# Patient Record
Sex: MALE | NOT HISPANIC OR LATINO | Employment: UNEMPLOYED | URBAN - METROPOLITAN AREA
[De-identification: names, ages, dates, MRNs, and addresses within clinical notes are randomized per-mention and may not be internally consistent; named-entity substitution may affect disease eponyms.]

---

## 2023-10-20 PROBLEM — D22.72 MELANOCYTIC NEVI OF LEFT LOWER LIMB, INCLUDING HIP: Status: ACTIVE | Noted: 2023-07-06

## 2023-10-20 PROBLEM — D22.39 MELANOCYTIC NEVI OF OTHER PARTS OF FACE: Status: ACTIVE | Noted: 2023-07-06

## 2023-10-20 PROBLEM — L40.0 PSORIASIS VULGARIS: Status: ACTIVE | Noted: 2023-07-06

## 2023-10-20 PROBLEM — L73.9 FOLLICULAR DISORDER, UNSPECIFIED: Status: ACTIVE | Noted: 2023-07-06

## 2023-10-20 PROBLEM — B07.8 OTHER VIRAL WARTS: Status: ACTIVE | Noted: 2023-07-06

## 2023-10-20 PROBLEM — S60.012A CONTUSION OF LEFT THUMB WITHOUT DAMAGE TO NAIL: Status: ACTIVE | Noted: 2023-07-06

## 2023-10-20 PROBLEM — D22.62 MELANOCYTIC NEVI OF LEFT UPPER LIMB, INCLUDING SHOULDER: Status: ACTIVE | Noted: 2023-07-06

## 2023-10-20 PROBLEM — D22.5 MELANOCYTIC NEVI OF TRUNK: Status: ACTIVE | Noted: 2023-07-06

## 2023-10-20 PROBLEM — L82.1 OTHER SEBORRHEIC KERATOSIS: Status: ACTIVE | Noted: 2023-07-06

## 2023-10-20 PROBLEM — D22.61 MELANOCYTIC NEVI OF RIGHT UPPER LIMB, INCLUDING SHOULDER: Status: ACTIVE | Noted: 2023-07-06

## 2023-10-20 PROBLEM — D18.01 HEMANGIOMA OF SKIN AND SUBCUTANEOUS TISSUE: Status: ACTIVE | Noted: 2023-07-06

## 2023-10-20 PROBLEM — D48.5 NEOPLASM OF UNCERTAIN BEHAVIOR OF SKIN: Status: ACTIVE | Noted: 2023-07-06

## 2023-10-20 PROBLEM — L81.4 OTHER MELANIN HYPERPIGMENTATION: Status: ACTIVE | Noted: 2023-07-06

## 2023-10-20 PROBLEM — D22.71 MELANOCYTIC NEVI OF RIGHT LOWER LIMB, INCLUDING HIP: Status: ACTIVE | Noted: 2023-07-06

## 2023-10-20 RX ORDER — PIMECROLIMUS 10 MG/G
CREAM TOPICAL
COMMUNITY
Start: 2021-12-01

## 2023-10-20 RX ORDER — CLINDAMYCIN PHOSPHATE 10 MG/G
GEL TOPICAL
COMMUNITY
Start: 2021-11-03 | End: 2023-10-24 | Stop reason: ALTCHOICE

## 2023-10-20 RX ORDER — CLINDAMYCIN AND BENZOYL PEROXIDE 1 %-5 %
KIT TOPICAL
COMMUNITY
Start: 2021-11-03 | End: 2023-10-24 | Stop reason: ALTCHOICE

## 2023-10-20 RX ORDER — HYDROCORTISONE 25 MG/G
CREAM TOPICAL
COMMUNITY
Start: 2021-11-03

## 2023-10-20 RX ORDER — MUPIROCIN 20 MG/G
OINTMENT TOPICAL
COMMUNITY
Start: 2022-07-20 | End: 2023-10-24 | Stop reason: ALTCHOICE

## 2023-10-24 ENCOUNTER — OFFICE VISIT (OUTPATIENT)
Dept: DERMATOLOGY | Facility: CLINIC | Age: 48
End: 2023-10-24
Payer: COMMERCIAL

## 2023-10-24 DIAGNOSIS — K50.918: ICD-10-CM

## 2023-10-24 DIAGNOSIS — L92.8: ICD-10-CM

## 2023-10-24 DIAGNOSIS — B07.8 OTHER VIRAL WARTS: Primary | ICD-10-CM

## 2023-10-24 PROCEDURE — 99214 OFFICE O/P EST MOD 30 MIN: CPT | Performed by: DERMATOLOGY

## 2023-10-24 PROCEDURE — 17110 DESTRUCTION B9 LES UP TO 14: CPT | Performed by: DERMATOLOGY

## 2023-10-24 RX ORDER — BUSPIRONE HYDROCHLORIDE 5 MG/1
TABLET ORAL
COMMUNITY
Start: 2022-06-20 | End: 2023-12-04 | Stop reason: ALTCHOICE

## 2023-10-24 RX ORDER — TADALAFIL 5 MG/1
5 TABLET ORAL
COMMUNITY
Start: 2022-12-15

## 2023-10-24 RX ORDER — FOLIC ACID 0.4 MG
TABLET ORAL
COMMUNITY

## 2023-10-24 RX ORDER — HYDROCORTISONE 25 MG/G
OINTMENT TOPICAL
Qty: 28 G | Refills: 0 | Status: SHIPPED | OUTPATIENT
Start: 2023-10-24

## 2023-10-24 RX ORDER — NEOMYCIN SULFATE, POLYMYXIN B SULFATE AND DEXAMETHASONE 3.5; 10000; 1 MG/ML; [USP'U]/ML; MG/ML
SUSPENSION/ DROPS OPHTHALMIC
COMMUNITY
Start: 2020-04-27 | End: 2023-12-04 | Stop reason: ALTCHOICE

## 2023-10-24 RX ORDER — ACETAMINOPHEN 500 MG
1 TABLET ORAL
COMMUNITY

## 2023-10-24 RX ORDER — BUDESONIDE 3 MG/1
CAPSULE, COATED PELLETS ORAL
COMMUNITY
Start: 2023-03-15

## 2023-10-24 RX ORDER — BACLOFEN 10 MG/1
10 TABLET ORAL 3 TIMES DAILY PRN
COMMUNITY
Start: 2023-06-22 | End: 2023-12-27 | Stop reason: ALTCHOICE

## 2023-10-24 NOTE — PROGRESS NOTES
Subjective     Romaine Crews is a 48 y.o. male who presents for the following: Wart (Right index finger- hx ln2, pt states has been doing otc salicylic acid - pt states was gone but now is back. ).     Review of Systems:  No other skin or systemic complaints other than what is documented elsewhere in the note.    The following portions of the chart were reviewed this encounter and updated as appropriate:   Allergies  Meds  Problems  Med Hx  Surg Hx  Fam Hx         Skin Cancer History  No skin cancer on file.      Specialty Problems          Dermatology Problems    Contusion of left thumb without damage to nail    Follicular disorder, unspecified    Hemangioma of skin and subcutaneous tissue    Melanocytic nevi of left lower limb, including hip    Melanocytic nevi of left upper limb, including shoulder    Melanocytic nevi of other parts of face    Melanocytic nevi of right lower limb, including hip    Melanocytic nevi of right upper limb, including shoulder    Melanocytic nevi of trunk    Neoplasm of uncertain behavior of skin    Other melanin hyperpigmentation    Other seborrheic keratosis    Other viral warts    Psoriasis vulgaris        Objective   Well appearing patient in no apparent distress; mood and affect are within normal limits.    A focused skin examination was performed. All findings within normal limits unless otherwise noted below.    Assessment/Plan   1. Other viral warts  Right Proximal 2nd Finger  Verrucous papule(s)/plaque(s)    -Discussed nature of condition  -Multiple treatments in office are often needed  -Diligent at home therapy is often needed, continue salicylic acid 40% nightly under occlusion  -Cryotherapy today  -Possible side effects of liquid nitrogen treatment reviewed including formation of blisters, crusting, tenderness, scar, and discoloration which may be permanent.  -Patient advised to return the office for re-evaluation if the treated lesion(s) do not resolve within 4-6  weeks. Patient verbalizes understanding.    Destr of lesion - Right Proximal 2nd Finger  Complexity: simple    Destruction method: cryotherapy    Informed consent: discussed and consent obtained    Debridement: hyperkeratotic portion removed with sharp debridement    Lesion destroyed using liquid nitrogen: Yes    Cryotherapy cycles:  3  Outcome: patient tolerated procedure well with no complications    Post-procedure details: wound care instructions given      2. Metastatic Crohn's disease of skin (CMS/HCC)  Anus    Patient with cutaneous inflammation of the perianal skin  -Flaring presently per patient due to changing of Crohn's disease medications (patient potentially to start Entyvio)  -Pt reports that HC cream burns  -Start HC ointment 2.5% BID for 2 weeks, then discontinue and resume pimecrolimus BID for maintenance    Related Medications  hydrocortisone 2.5 % ointment  Apply to affected areas twice daily when active as needed. Use less than 14 days per month.        Follow up in 4 weeks for wart  Discussed if there are any changes or development of concerning symptoms (lesion/skin condition is changing, bleeding, enlarging, or worsening) the patient is to contact my office. The patient verbalizes understanding.    Le Lambert MD  10/24/2023

## 2023-11-28 ENCOUNTER — OFFICE VISIT (OUTPATIENT)
Dept: GASTROENTEROLOGY | Facility: CLINIC | Age: 48
End: 2023-11-28
Payer: COMMERCIAL

## 2023-11-28 VITALS
HEART RATE: 61 BPM | DIASTOLIC BLOOD PRESSURE: 70 MMHG | WEIGHT: 185 LBS | BODY MASS INDEX: 26.48 KG/M2 | SYSTOLIC BLOOD PRESSURE: 110 MMHG | HEIGHT: 70 IN | OXYGEN SATURATION: 98 %

## 2023-11-28 DIAGNOSIS — K51.919 ULCERATIVE COLITIS WITH COMPLICATION, UNSPECIFIED LOCATION (MULTI): Primary | ICD-10-CM

## 2023-11-28 PROCEDURE — 1036F TOBACCO NON-USER: CPT | Performed by: INTERNAL MEDICINE

## 2023-11-28 PROCEDURE — 99205 OFFICE O/P NEW HI 60 MIN: CPT | Performed by: INTERNAL MEDICINE

## 2023-11-28 RX ORDER — SULFASALAZINE 500 MG/1
500 TABLET ORAL 4 TIMES DAILY
COMMUNITY

## 2023-11-28 NOTE — PATIENT INSTRUCTIONS
I will request records from your rheumatologist as well as your gastroenterologist.    I will also reach out to your rheumatologist to discuss potential treatments for UC.      Follow up after review of records.

## 2023-11-28 NOTE — PROGRESS NOTES
[facility]    ASSESSMENT and PLAN:       Romaine Crews is a 48 y.o. male with a significant past medical history of depression/anxiety, melanocytic nevi and psoriasis who presents for consultation requested by his primary care provider (Justina Renee MD) for a second opinion.       UC  History of UC previously followed at [facility] and now followed at [facility]. He has been referred to  for a second opinion and it appears that he was intended to see Dr. Shaw. He has been on biologics (Remicade and now Stelara) with incomplete response. 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 His GI team was considering a change in treatment and possibly Entyvio, but they had recommended evaluation at a tertiary care center for consideration of combination therapy with Entyvio and Stelara. Appears that Stelara is being managed by his rheumatologist. Entyvio would certainly be an option for UC, but given the gut specificity it seems less likely to control any coexisting rheumatologic disease. Could also consider small molecules such as Tofacitinib, upadacitinib, Ozanimod, or Etrasimod. Unclear if those would be acceptable monotherapy for his rheumatologic disease. Will request complete records from his current GI providers as well as his rheumatologist. I did also reach out to his rheumatologist via telephone on 11/29 at [phone], but unfortunately there was no answer at the number listed. Will attempt to discuss case further with rheumatology again and then may need to discuss case at  multidisciplinary IBD conference.        Hamlet Winters MD        Gastroenterology    [facility]    Clinical   [facility]        Subjective   HISTORY OF PRESENT ILLNESS:     Chief Complaint  Referral for second opinion (Referred by [facility] GI - colitis /Last stelara is due  "12/18/23)    History Of Present Illness:    Romaine Crews is a 48 y.o. male with a significant past medical history of depression/anxiety, melanocytic nevi and psoriasis who presents for consultation requested by his primary care provider (Justina Renee MD) for a second opinion.       Medication list includes Ustekinumab and Budesonide.    He was seen by dermatology in October and they reported a history of Crohn's disease with perianal involvement and a possible plan to change Crohn's treatment to Entyvio. Another note from his PCP in June states that he has a history of UC (pancolitis).    There are no endoscopy reports available in the EMR.    Most recent CRP was normal in January 2023.      Mr. Crews reports that he has a long history of \"colitis\" that has been followed by multiple GI providers in the past. He says that he followed with Jeannine Stallings MD at [facility] up until 3-4 years ago and since then has followed with GI at [facility]. He says that he has been seeing ISIDRA Nassar at [facility]. He says that he was last seen by her in October and at that time he was told to find somebody to follow with that would prescribe Entyvio. He thinks that Bria may have talked to the physicians at [facility] and that evaluation by Dr. Shaw (IBD specialist) at  was recommended.    He says that he also follows with Marian Sibley MD at [facility] for rheumatology. He says that he has multiple rheumatologic issues including SAPHO (synovitis, acne, pustulosis palmaris, hyperostosis, osteomyelitis) syndrome and Dr. Sibley was the one that was prescribing his Stelara because it was thought that it would treat his IBD as well as his rheumatologic disease.    Full records from Dr. Sibley and Bria Carter are not available. A telephone note from Bria Carter on 10/12 indicates that she had discussed his case with Dr. Uribe who had recommended referral to  (Dr. Shaw) due to complexity of " "case. An office visit from Bria Carter dated 10/10/2023 stated that he was previously followed by Dr. Hendrix and Dr. Lugo and Dr. Arthur for UC and that \"Remicade discontinued; started Stelara per --Rheumatology (for osteomeylitis)\". A summary of previous endoscopies included in that note is copied below.    \"Colon 2009 - severe pancolitis with pseudopolyps  Colon 2013 - severe distal colitis up to 30cm from anal verge. Pseudopolyps. ---> added rowasa  Colon 3/2016 - pancolitis  Colon 3/2017 - pancolitis  Flex sig 12/2017- Dr. Stinson  Colonoscopy 11/2018 - pancolitis  Colonoscopy 2019 Dr. Elena- proctitis  Last colonoscopy 3/8/2023 Dr. Nam  Last EGD 2/28/2022 Dr. Nam  MRE- ? Ileitis- suspect backwash ileitis\"    He says that he was diagnosed with UC in 2006 and initially was treated with Asacol, but this was stopped because of headaches. He says that he was then started on Azulfidine. He says that he has then been on Azulfidine since that time and currently he is taking 4 pills daily.    He says that he was also treated with Remicade for 6-8 years starting in 2012. He says that this was stopped due to a lack of response and he says that there was some lab that was checked which showed that Remicade wouldn't work. He isn't sure what that was, but thinks that it might have been antibodies to Remicade.    He has now been on Stelara for the last 3 years which has been prescribed by his rheumatologist.    He has also used steroids. He is currently on Budesonide and has been on 9 mg per day for the last 2 months. He was told to start weaning that medication and he says that he is about to decrease it to 6 mg per day. He has used prednisone in the past and he says that at one time he was on prednisone for a very long time. Ultimately he says that prednisone was stopped in 2011 because he developed some abnormal bone/bone marrow growth that required surgery. At that time he says that he was told never to " use prednisone again.    His last colonoscopy was in March 2023. He brought a procedure report from that time which showed a normal TI, erythema at the IC valve, and diffuse erythematous/scarred mucosa with pseudopolyps through the entire colon. Biopsies from the TI showed normal mucosa. IC valve biopsies showed lymphoid aggregates with minimal acute inflammation. Remainder of the pathology showed active inflammatory changes in the cecum/ascending colon as well as chronic colitis changes in the remainder of the colon. The colonoscopy report does not mention if findings are improved or worsened from previous. The patient says that he thinks it might have been worse.        Currently he says that he has been having 5-10 BMs per day. He has not had any blood in his stools for the last month. He says that he frequently has tenesmus and he was told in the past that it was partially due to pelvic floor dysfunction and physical therapy was recommended. He denies any abdominal pain and says that his weight has been stable.      Patient denies any heartburn/GERD, N/V, dysphagia, odynophagia, abdominal pain, constipation, hematemesis, hematochezia, melena, or weight loss.      Review of systems:   Review of Systems   Constitutional:  Positive for fatigue. Negative for chills, fever and unexpected weight change.   HENT:  Negative for congestion, sneezing, sore throat, trouble swallowing and voice change.    Respiratory:  Negative for cough, shortness of breath and wheezing.    Cardiovascular:  Negative for chest pain, palpitations and leg swelling.   Gastrointestinal:         As detailed above.   Genitourinary:  Negative for dysuria and hematuria.   Musculoskeletal:  Positive for arthralgias. Negative for myalgias.   Skin:  Negative for pallor and rash.   Neurological:  Positive for weakness. Negative for dizziness, seizures, syncope, numbness and headaches.   Hematological:  Negative for adenopathy. Does not bruise/bleed  "easily.   Psychiatric/Behavioral:  Negative for confusion. The patient is nervous/anxious.    All other systems reviewed and are negative.      I performed a complete 10 point review of systems and it is negative except as noted in HPI or above.      PAST HISTORIES:       Past Medical History:  He has no past medical history on file.    Past Surgical History:  He has no past surgical history on file.      Social History:  He reports that he has never smoked. He has never used smokeless tobacco. He reports that he does not drink alcohol and does not use drugs.    Family History:      Family History   Problem Relation Name Age of Onset    Ulcerative colitis Brother      Pancreatic cancer Mother's Brother      Liver cancer Mother's Brother      Colon cancer Mother's Brother      Allergies Other      Asthma Other      Eczema Other          Allergies:  Ciprofloxacin, Doxycycline, and Ketamine      Objective   OBJECTIVE:       Last Recorded Vitals:  Vitals:    11/28/23 1046   BP: 110/70   Pulse: 61   SpO2: 98%   Weight: 83.9 kg (185 lb)   Height: 1.778 m (5' 10\")     /70   Pulse 61   Ht 1.778 m (5' 10\")   Wt 83.9 kg (185 lb)   SpO2 98%   BMI 26.54 kg/m²      Physical Exam:    Physical Exam  Vitals reviewed.   Constitutional:       General: He is not in acute distress.     Appearance: He is not ill-appearing.   HENT:      Head: Normocephalic and atraumatic.   Eyes:      General: No scleral icterus.  Cardiovascular:      Rate and Rhythm: Normal rate and regular rhythm.      Pulses: Normal pulses.      Heart sounds: Normal heart sounds. No murmur heard.  Pulmonary:      Effort: Pulmonary effort is normal. No respiratory distress.      Breath sounds: Normal breath sounds. No wheezing.   Abdominal:      General: Bowel sounds are normal.      Palpations: Abdomen is soft.      Tenderness: There is no abdominal tenderness. There is no rebound.   Musculoskeletal:         General: No swelling or deformity.   Skin:     " "General: Skin is warm and dry.      Coloration: Skin is not jaundiced.      Findings: No rash.   Neurological:      General: No focal deficit present.      Mental Status: He is alert and oriented to person, place, and time.   Psychiatric:         Mood and Affect: Mood normal.         Behavior: Behavior normal.         Thought Content: Thought content normal.         Judgment: Judgment normal.         Home Medications:  Prior to Admission medications    Medication Sig Start Date End Date Taking? Authorizing Provider   baclofen (Lioresal) 10 mg tablet Take 1 tablet (10 mg) by mouth 3 times a day as needed. 6/22/23 12/11/23  Historical Provider, MD   budesonide EC (Entocort EC) 3 mg 24 hr capsule Take by mouth. 3/15/23   Historical Provider, MD   busPIRone (Buspar) 5 mg tablet  6/20/22   Historical Provider, MD   cholecalciferol (Vitamin D-3) 50 mcg (2,000 unit) capsule Take 1 capsule (2,000 Units) by mouth once daily.    Historical Provider, MD   folic acid (Folvite) 400 mcg tablet once daily.    Historical Provider, MD   hydrocortisone 2.5 % cream 1 Application 11/3/21   Historical Provider, MD   hydrocortisone 2.5 % ointment Apply to affected areas twice daily when active as needed. Use less than 14 days per month. 10/24/23   Le Lambert MD   neomycin-polymyxin-dexAMETHasone (Maxitrol) 3.5mg/mL-10,000 unit/mL-0.1 % ophthalmic suspension SHAKE LQ AND INT 1 GTT IN OU QID FOR 14 DAYS 4/27/20   Historical Provider, MD   pimecrolimus (Elidel) 1 % cream 1 Application 12/1/21   Historical Provider, MD   tadalafil (Cialis) 5 mg tablet Take 1 tablet (5 mg) by mouth once daily. 12/15/22   Historical Provider, MD   ustekinumab (Stelara) solution 520 mg 8/3/20   Historical Provider, MD         Relevant Results Recent labs reviewed in the EMR.  No results found for: \"HGB\", \"MCV\", \"PLT\"    No results found for: \"FERRITIN\", \"IRON\"    No results found for: \"NA\", \"K\", \"CL\", \"BUN\", \"CREATININE\"    No results found for: " "\"BILITOT\"  No results found for: \"ALT\", \"AST\", \"ALKPHOS\"    No results found for: \"CRP\"    No results found for: \"CALPS\"    Radiology: Imaging reviewed in the EMR.  No results found.      "

## 2023-11-29 ASSESSMENT — ENCOUNTER SYMPTOMS
MYALGIAS: 0
SEIZURES: 0
SHORTNESS OF BREATH: 0
HEADACHES: 0
TROUBLE SWALLOWING: 0
PALPITATIONS: 0
ARTHRALGIAS: 1
CHILLS: 0
UNEXPECTED WEIGHT CHANGE: 0
WEAKNESS: 1
NERVOUS/ANXIOUS: 1
FEVER: 0
DIZZINESS: 0
ROS GI COMMENTS: AS DETAILED ABOVE.
CONFUSION: 0
BRUISES/BLEEDS EASILY: 0
HEMATURIA: 0
DYSURIA: 0
VOICE CHANGE: 0
ADENOPATHY: 0
SORE THROAT: 0
NUMBNESS: 0
WHEEZING: 0
COUGH: 0
FATIGUE: 1

## 2023-12-01 ENCOUNTER — TELEPHONE (OUTPATIENT)
Dept: GASTROENTEROLOGY | Facility: CLINIC | Age: 48
End: 2023-12-01
Payer: COMMERCIAL

## 2023-12-01 ASSESSMENT — DERMATOLOGY QUALITY OF LIFE (QOL) ASSESSMENT
RATE HOW BOTHERED YOU ARE BY SYMPTOMS OF YOUR SKIN PROBLEM (EG, ITCHING, STINGING BURNING, HURTING OR SKIN IRRITATION): 6 - ALWAYS BOTHERED
RATE HOW BOTHERED YOU ARE BY EFFECTS OF YOUR SKIN PROBLEMS ON YOUR ACTIVITIES (EG, GOING OUT, ACCOMPLISHING WHAT YOU WANT, WORK ACTIVITIES OR YOUR RELATIONSHIPS WITH OTHERS): 6 - ALWAYS BOTHERED
WHAT SINGLE SKIN CONDITION LISTED BELOW IS THE PATIENT ANSWERING THE QUALITY-OF-LIFE ASSESSMENT QUESTIONS ABOUT: DERMATITIS
WHAT SINGLE SKIN CONDITION LISTED BELOW IS THE PATIENT ANSWERING THE QUALITY-OF-LIFE ASSESSMENT QUESTIONS ABOUT: DERMATITIS
RATE HOW BOTHERED YOU ARE BY EFFECTS OF YOUR SKIN PROBLEMS ON YOUR ACTIVITIES (EG, GOING OUT, ACCOMPLISHING WHAT YOU WANT, WORK ACTIVITIES OR YOUR RELATIONSHIPS WITH OTHERS): 6 - ALWAYS BOTHERED
RATE HOW EMOTIONALLY BOTHERED YOU ARE BY YOUR SKIN PROBLEM (FOR EXAMPLE, WORRY, EMBARRASSMENT, FRUSTRATION): 6 - ALWAYS BOTHERED
DATE THE QUALITY-OF-LIFE ASSESSMENT WAS COMPLETED: 60649
RATE HOW EMOTIONALLY BOTHERED YOU ARE BY YOUR SKIN PROBLEM (FOR EXAMPLE, WORRY, EMBARRASSMENT, FRUSTRATION): 6 - ALWAYS BOTHERED
RATE HOW BOTHERED YOU ARE BY SYMPTOMS OF YOUR SKIN PROBLEM (EG, ITCHING, STINGING BURNING, HURTING OR SKIN IRRITATION): 6 - ALWAYS BOTHERED

## 2023-12-01 ASSESSMENT — PATIENT GLOBAL ASSESSMENT (PGA): WHAT IS THE PGA: PATIENT GLOBAL ASSESSMENT:  3 - MODERATE

## 2023-12-01 NOTE — TELEPHONE ENCOUNTER
----- Message from Heath Aguillon MA sent at 2023  3:08 PM EST -----  Regarding: mail from Dr. Winters  Per email:     Please schedule a time for me to speak to Marian Sibley regarding Romaine Crews (MRN [id number],  1975). She is his rheumatologist. [phone]     might be an okay time for me.      I have left a message with Dr. Sibley's answering service in regards to Dr. Winters's request. Will await call back.

## 2023-12-04 ENCOUNTER — OFFICE VISIT (OUTPATIENT)
Dept: DERMATOLOGY | Facility: CLINIC | Age: 48
End: 2023-12-04
Payer: COMMERCIAL

## 2023-12-04 DIAGNOSIS — B07.8 OTHER VIRAL WARTS: Primary | ICD-10-CM

## 2023-12-04 DIAGNOSIS — L81.4 LENTIGO: ICD-10-CM

## 2023-12-04 DIAGNOSIS — L28.0 LICHEN SIMPLEX CHRONICUS: ICD-10-CM

## 2023-12-04 DIAGNOSIS — L82.1 SEBORRHEIC KERATOSIS: ICD-10-CM

## 2023-12-04 DIAGNOSIS — D22.9 MULTIPLE BENIGN NEVI: ICD-10-CM

## 2023-12-04 DIAGNOSIS — D18.01 HEMANGIOMA OF SKIN: ICD-10-CM

## 2023-12-04 PROCEDURE — 99214 OFFICE O/P EST MOD 30 MIN: CPT | Performed by: DERMATOLOGY

## 2023-12-04 PROCEDURE — 17110 DESTRUCTION B9 LES UP TO 14: CPT | Performed by: DERMATOLOGY

## 2023-12-04 PROCEDURE — 1036F TOBACCO NON-USER: CPT | Performed by: DERMATOLOGY

## 2023-12-04 RX ORDER — VILAZODONE HYDROCHLORIDE 10 MG/1
10 TABLET ORAL
COMMUNITY
Start: 2023-11-14 | End: 2023-12-27 | Stop reason: ALTCHOICE

## 2023-12-04 RX ORDER — CLOBETASOL PROPIONATE 0.5 MG/G
CREAM TOPICAL
Qty: 45 G | Refills: 1 | Status: SHIPPED | OUTPATIENT
Start: 2023-12-04

## 2023-12-04 NOTE — PROGRESS NOTES
Subjective     Romaine Crews is a 48 y.o. male who presents for the following: Skin Check (Chaperone offered and declined. ) and Wart (Right proximal 2nd finger- pt states using salicylic acid 40% - hx ln2- pt states lesion went away and now is back).     Review of Systems:  No other skin or systemic complaints other than what is documented elsewhere in the note.    The following portions of the chart were reviewed this encounter and updated as appropriate:  Tobacco  Allergies  Meds  Problems  Med Hx  Surg Hx  Fam Hx         Skin Cancer History  No skin cancer on file.      Specialty Problems          Dermatology Problems    Contusion of left thumb without damage to nail    Follicular disorder, unspecified    Hemangioma of skin and subcutaneous tissue    Melanocytic nevi of left lower limb, including hip    Melanocytic nevi of left upper limb, including shoulder    Melanocytic nevi of other parts of face    Melanocytic nevi of right lower limb, including hip    Melanocytic nevi of right upper limb, including shoulder    Melanocytic nevi of trunk    Neoplasm of uncertain behavior of skin    Other melanin hyperpigmentation    Other seborrheic keratosis    Other viral warts    Psoriasis vulgaris        Objective     Well appearing patient in no apparent distress; mood and affect are within normal limits.    A full examination was performed including scalp, face, neck, chest, abdomen, back, bilateral upper extremities, bilateral lower extremities, genitalia, perianal skin.    All findings within normal limits unless otherwise noted below.    Assessment/Plan   1. Other viral warts (2)  Right Proximal 2nd Finger (2)  Verrucous papule(s)/plaque(s)    -Discussed nature of condition  -Multiple treatments in office are often needed  -Diligent at home therapy is often needed; continue salicylic acid 40%  -Cryotherapy today  -Possible side effects of liquid nitrogen treatment reviewed including formation of blisters,  crusting, tenderness, scar, and discoloration which may be permanent.  -Patient advised to return the office for re-evaluation if the treated lesion(s) do not resolve within 4-6 weeks. Patient verbalizes understanding.    Destr of lesion - Right Proximal 2nd Finger  Complexity: simple    Destruction method: cryotherapy    Informed consent: discussed and consent obtained    Lesion destroyed using liquid nitrogen: Yes    Cryotherapy cycles:  2  Outcome: patient tolerated procedure well with no complications    Post-procedure details: wound care instructions given      2. Lichen simplex chronicus  Left Ankle - Anterior, Left Knee - Anterior, Right Knee - Anterior  Lichenified plaque(s)    -Discussed nature of diagnosis  -Recommend discontinuation of pressure or manipulation of the area  -Topical steroids may help to thin out areas of thickened skin  -Discussed with/information given to the patient on the risks, benefits and alternatives of the usage of topical corticosteroids, including but not limited to: atrophy (thinning of the skin), striae (stretch marks), telangiectasia (blood vessel growth), and dyspigmentation (discoloration of the skin).  -Recommend to limit long-term use of topical corticosteroids to less than 14 days per month to reduce risk of side effects.      clobetasol (Temovate) 0.05 % cream - Left Ankle - Anterior, Left Knee - Anterior, Right Knee - Anterior  Apply to affected area twice daily. Use for 2 weeks, then take 2 weeks off. May restart cycle as needed.    3. Multiple benign nevi  Brown and tan macules and papules with reassuring findings on dermoscopy    -These lesions have benign, reassuring patterns on dermoscopy  -Recommend continued self observation, and to contact the office if any changes in nevi are noticed    4. Lentigo  Tan macules    -Benign appearing on exam  -Reassurance, recommend observation    5. Seborrheic keratosis  Stuck on, waxy macule(s)/papule(s)/plaque(s) with  comedo-like openings and milia like cysts    -Discussed the nature of the diagnosis  -Reassurance, recommend continued observation    6. Hemangioma of skin  Cherry red papules    -Discussed the nature of the diagnosis  -Reassurance, recommend continued observation        Discussed/information given on safe sun practices and use of sunscreen, sun protective clothing or sun avoidance. Recommend to use over the counter medication of sunscreen with a SPF 30 or higher on a daily basis prior to sun exposure to reduce the risk of skin cancer.    Follow up in 3-4 weeks for wart, 1 year for FSE  Discussed if there are any changes or development of concerning symptoms (lesion/skin condition is changing, bleeding, enlarging, or worsening) the patient is to contact my office. The patient verbalizes understanding.     Le Lambert MD  12/4/2023

## 2023-12-05 ENCOUNTER — TELEPHONE (OUTPATIENT)
Dept: GASTROENTEROLOGY | Facility: CLINIC | Age: 48
End: 2023-12-05
Payer: COMMERCIAL

## 2023-12-05 ENCOUNTER — DOCUMENTATION (OUTPATIENT)
Dept: GASTROENTEROLOGY | Facility: CLINIC | Age: 48
End: 2023-12-05
Payer: COMMERCIAL

## 2023-12-05 DIAGNOSIS — K51.919 ULCERATIVE COLITIS WITH COMPLICATION, UNSPECIFIED LOCATION (MULTI): Primary | ICD-10-CM

## 2023-12-05 DIAGNOSIS — K51.019 ULCERATIVE PANCOLITIS WITH COMPLICATION (MULTI): Primary | ICD-10-CM

## 2023-12-05 NOTE — PROGRESS NOTES
"  OSH Records obtained from [facility]. Reviewed and summarized below. Original results scanned.      Office Visits:  10/10/2023 - Bria Carter, APRN-CNP  - Dx: ulcerative pancolitis  \"Diagnosed in 2006. Has followed with myself, Dr. Hendrix, Dr. Lugo - [facility], and Dr. Arthur - [facility]. Remicade discontinued in 2020; started Stelara per - Rheumatology (for osteomeylitis). Symptomatically UC has not recently been well controlled. Last colonoscopy showed ulceration at ileocecal valve and chronic active colitis throughout colon.\"  Plan: discuss plan of care with Dr. Sibley, consider referral to , ?addition of Entyvio      Endoscopy History  3/8/2023 - Colonoscopy: normal TI (no abnormality on path), localized area of erythematous mucosa at IC valve, diffuse erythematous/scarred mucosa with altered vascular markings and pseudopolyps from the rectum to the cecum (chronic active colitis on path)    "

## 2023-12-05 NOTE — TELEPHONE ENCOUNTER
Spoke to patient's Rheumatologist (Dr. Sibley).    She reports that she has been following the patient for many years after he was initially referred for chronic osteomyelitis (CRMO). She ultimately did not feel that was an accurate diagnosis and has been treating him for SAPHO syndrome, but she says at this time his rheumatologic disease has been in remission. She says that he had previously stopped Remicade because he had developed antibodies.    Currently she feels that his rheumatologic disease is secondary to his IBD and she feels that treatment should be focused on his IBD. She is agreeable with any change in his treatment and she would favor IBD treatment as monotherapy at this time. I specifically asked about Entyvio given its gut specific mechanism of action and she felt that would be an acceptable option given the current remission of his rheumatologic disease and the likelihood that his rheum issues are secondary to IBD.    She is not aware of any history of cardiac disease or VTE or any other contraindication for small molecule treatments such as MONI inhibitors or Ozanimod.        Spoke to the patient.     He does have history of liver disease so would need to use caution with Ozanimod. We discussed possible treatment options including Entyvio, Tofacitinib, and Upadacitinib. At this time he would like to start Entyvio. This would replace his current Stelara.    He wants to do these infusions at the [facility] infusion center at [facility]. He is also thinking about keeping his current GI providers at [facility] as his primary GI team as he has difficulty driving to our office or other  offices. He is going to reach out to his GI team at [facility] to see if they would be agreeable to prescribing Entyvio as monotherapy for his UC.    He will contact my office to let us know if they will be managing his Entyvio or not.    In the meantime will have my office staff reach out to the [facility] infusion [place]  ([phone]) to determine what information they would need regarding order for Entyvio. Will also order labs (TB and Hep B serologies) as those have not been checked since 2020.

## 2023-12-08 NOTE — TELEPHONE ENCOUNTER
Spoke to patient. He states that his gastroenterologist at [facility] is going to start Entyvio and manage the ordering of that medication.    He will follow up with  GI as needed.

## 2023-12-26 ASSESSMENT — DERMATOLOGY QUALITY OF LIFE (QOL) ASSESSMENT
WHAT SINGLE SKIN CONDITION LISTED BELOW IS THE PATIENT ANSWERING THE QUALITY-OF-LIFE ASSESSMENT QUESTIONS ABOUT: NONE OF THE ABOVE
RATE HOW BOTHERED YOU ARE BY SYMPTOMS OF YOUR SKIN PROBLEM (EG, ITCHING, STINGING BURNING, HURTING OR SKIN IRRITATION): 6 - ALWAYS BOTHERED
WHAT SINGLE SKIN CONDITION LISTED BELOW IS THE PATIENT ANSWERING THE QUALITY-OF-LIFE ASSESSMENT QUESTIONS ABOUT: NONE OF THE ABOVE
RATE HOW EMOTIONALLY BOTHERED YOU ARE BY YOUR SKIN PROBLEM (FOR EXAMPLE, WORRY, EMBARRASSMENT, FRUSTRATION): 3
RATE HOW BOTHERED YOU ARE BY SYMPTOMS OF YOUR SKIN PROBLEM (EG, ITCHING, STINGING BURNING, HURTING OR SKIN IRRITATION): 6 - ALWAYS BOTHERED
RATE HOW BOTHERED YOU ARE BY EFFECTS OF YOUR SKIN PROBLEMS ON YOUR ACTIVITIES (EG, GOING OUT, ACCOMPLISHING WHAT YOU WANT, WORK ACTIVITIES OR YOUR RELATIONSHIPS WITH OTHERS): 4
RATE HOW EMOTIONALLY BOTHERED YOU ARE BY YOUR SKIN PROBLEM (FOR EXAMPLE, WORRY, EMBARRASSMENT, FRUSTRATION): 3
RATE HOW BOTHERED YOU ARE BY EFFECTS OF YOUR SKIN PROBLEMS ON YOUR ACTIVITIES (EG, GOING OUT, ACCOMPLISHING WHAT YOU WANT, WORK ACTIVITIES OR YOUR RELATIONSHIPS WITH OTHERS): 4

## 2023-12-26 ASSESSMENT — PATIENT GLOBAL ASSESSMENT (PGA): WHAT IS THE PGA: PATIENT GLOBAL ASSESSMENT:  3 - MODERATE

## 2023-12-27 ENCOUNTER — OFFICE VISIT (OUTPATIENT)
Dept: DERMATOLOGY | Facility: CLINIC | Age: 48
End: 2023-12-27
Payer: COMMERCIAL

## 2023-12-27 DIAGNOSIS — B07.8 OTHER VIRAL WARTS: Primary | ICD-10-CM

## 2023-12-27 PROCEDURE — 1036F TOBACCO NON-USER: CPT | Performed by: DERMATOLOGY

## 2023-12-27 PROCEDURE — 17110 DESTRUCTION B9 LES UP TO 14: CPT | Performed by: DERMATOLOGY

## 2023-12-27 RX ORDER — MESALAMINE 1000 MG/1
1000 SUPPOSITORY RECTAL
COMMUNITY
Start: 2023-08-29 | End: 2024-08-28

## 2023-12-27 RX ORDER — BUSPIRONE HYDROCHLORIDE 10 MG/1
TABLET ORAL
COMMUNITY
Start: 2023-09-12

## 2023-12-27 NOTE — PROGRESS NOTES
Subjective     Romaine Crews is a 48 y.o. male who presents for the following: Wart (Right 2nd finger. Presents for repeat LN2.).     Review of Systems:  No other skin or systemic complaints other than what is documented elsewhere in the note.    The following portions of the chart were reviewed this encounter and updated as appropriate:   Tobacco  Allergies  Meds  Problems  Med Hx  Surg Hx  Fam Hx         Skin Cancer History  No skin cancer on file.      Specialty Problems          Dermatology Problems    Contusion of left thumb without damage to nail    Follicular disorder, unspecified    Hemangioma of skin and subcutaneous tissue    Melanocytic nevi of left lower limb, including hip    Melanocytic nevi of left upper limb, including shoulder    Melanocytic nevi of other parts of face    Melanocytic nevi of right lower limb, including hip    Melanocytic nevi of right upper limb, including shoulder    Melanocytic nevi of trunk    Neoplasm of uncertain behavior of skin    Other melanin hyperpigmentation    Other seborrheic keratosis    Other viral warts    Psoriasis vulgaris        Objective   Well appearing patient in no apparent distress; mood and affect are within normal limits.    A focused skin examination was performed. All findings within normal limits unless otherwise noted below.    Assessment/Plan   1. Other viral warts  Right Proximal 2nd Finger  Verrucous papule    -Discussed nature of condition  -Multiple treatments in office are often needed  -Diligent at home therapy is often needed  -Cryotherapy today  -Possible side effects of liquid nitrogen treatment reviewed including formation of blisters, crusting, tenderness, scar, and discoloration which may be permanent.  -Patient advised to return the office for re-evaluation if the treated lesion(s) do not resolve within 4-6 weeks. Patient verbalizes understanding.    Destr of lesion - Right Proximal 2nd Finger  Complexity: simple    Destruction  method: cryotherapy    Informed consent: discussed and consent obtained    Lesion destroyed using liquid nitrogen: Yes    Outcome: patient tolerated procedure well with no complications    Post-procedure details: wound care instructions given          Follow up in 1 year for FSE; patient to return earlier if wart recurs  Discussed if there are any changes or development of concerning symptoms (lesion/skin condition is changing, bleeding, enlarging, or worsening) the patient is to contact my office. The patient verbalizes understanding.    Le Lambert MD  12/27/2023

## 2024-01-09 ENCOUNTER — APPOINTMENT (OUTPATIENT)
Dept: DERMATOLOGY | Facility: CLINIC | Age: 49
End: 2024-01-09
Payer: COMMERCIAL

## 2024-06-28 ENCOUNTER — TELEPHONE (OUTPATIENT)
Dept: DERMATOLOGY | Facility: CLINIC | Age: 49
End: 2024-06-28
Payer: COMMERCIAL

## 2024-06-28 NOTE — TELEPHONE ENCOUNTER
Pt contacted central scheduling for an appointment for assessment of a rash on the inner thigh per PCP.  Nurse contacted patient and was able to get patient appointment for 07/15/2024 at 8:15am for assessment.  No further questions noted.     Isaias Warner LPN

## 2024-07-13 ASSESSMENT — DERMATOLOGY QUALITY OF LIFE (QOL) ASSESSMENT
RATE HOW BOTHERED YOU ARE BY SYMPTOMS OF YOUR SKIN PROBLEM (EG, ITCHING, STINGING BURNING, HURTING OR SKIN IRRITATION): 6 - ALWAYS BOTHERED
WHAT SINGLE SKIN CONDITION LISTED BELOW IS THE PATIENT ANSWERING THE QUALITY-OF-LIFE ASSESSMENT QUESTIONS ABOUT: DERMATITIS
RATE HOW EMOTIONALLY BOTHERED YOU ARE BY YOUR SKIN PROBLEM (FOR EXAMPLE, WORRY, EMBARRASSMENT, FRUSTRATION): 5
RATE HOW BOTHERED YOU ARE BY EFFECTS OF YOUR SKIN PROBLEMS ON YOUR ACTIVITIES (EG, GOING OUT, ACCOMPLISHING WHAT YOU WANT, WORK ACTIVITIES OR YOUR RELATIONSHIPS WITH OTHERS): 5
RATE HOW BOTHERED YOU ARE BY SYMPTOMS OF YOUR SKIN PROBLEM (EG, ITCHING, STINGING BURNING, HURTING OR SKIN IRRITATION): 6 - ALWAYS BOTHERED
RATE HOW BOTHERED YOU ARE BY EFFECTS OF YOUR SKIN PROBLEMS ON YOUR ACTIVITIES (EG, GOING OUT, ACCOMPLISHING WHAT YOU WANT, WORK ACTIVITIES OR YOUR RELATIONSHIPS WITH OTHERS): 5
WHAT SINGLE SKIN CONDITION LISTED BELOW IS THE PATIENT ANSWERING THE QUALITY-OF-LIFE ASSESSMENT QUESTIONS ABOUT: DERMATITIS
RATE HOW EMOTIONALLY BOTHERED YOU ARE BY YOUR SKIN PROBLEM (FOR EXAMPLE, WORRY, EMBARRASSMENT, FRUSTRATION): 5
DATE THE QUALITY-OF-LIFE ASSESSMENT WAS COMPLETED: 67034

## 2024-07-13 ASSESSMENT — PATIENT GLOBAL ASSESSMENT (PGA): WHAT IS THE PGA: PATIENT GLOBAL ASSESSMENT:  3 - MODERATE

## 2024-07-15 ENCOUNTER — APPOINTMENT (OUTPATIENT)
Dept: DERMATOLOGY | Facility: CLINIC | Age: 49
End: 2024-07-15
Payer: COMMERCIAL

## 2024-07-15 DIAGNOSIS — L72.3 INFLAMED EPIDERMOID CYST OF SKIN: ICD-10-CM

## 2024-07-15 DIAGNOSIS — L40.9 PSORIASIS: ICD-10-CM

## 2024-07-15 DIAGNOSIS — K50.918: Primary | ICD-10-CM

## 2024-07-15 DIAGNOSIS — L73.9 FOLLICULITIS: ICD-10-CM

## 2024-07-15 DIAGNOSIS — L28.0 LICHEN SIMPLEX CHRONICUS: ICD-10-CM

## 2024-07-15 DIAGNOSIS — L92.8: Primary | ICD-10-CM

## 2024-07-15 PROCEDURE — 1036F TOBACCO NON-USER: CPT | Performed by: DERMATOLOGY

## 2024-07-15 PROCEDURE — G2211 COMPLEX E/M VISIT ADD ON: HCPCS | Performed by: DERMATOLOGY

## 2024-07-15 PROCEDURE — 99214 OFFICE O/P EST MOD 30 MIN: CPT | Performed by: DERMATOLOGY

## 2024-07-15 RX ORDER — MICONAZOLE NITRATE 2 %
2 CREAM (GRAM) TOPICAL
COMMUNITY

## 2024-07-15 RX ORDER — BACLOFEN 10 MG/1
10 TABLET ORAL
COMMUNITY
Start: 2024-06-14

## 2024-07-15 RX ORDER — BENZOYL PEROXIDE 50 MG/ML
LIQUID TOPICAL
COMMUNITY
Start: 2024-03-11

## 2024-07-15 RX ORDER — PIMECROLIMUS 10 MG/G
CREAM TOPICAL
Qty: 60 G | Refills: 3 | Status: SHIPPED | OUTPATIENT
Start: 2024-07-15

## 2024-07-15 RX ORDER — CLOBETASOL PROPIONATE 0.5 MG/G
CREAM TOPICAL
Qty: 60 G | Refills: 3 | Status: SHIPPED | OUTPATIENT
Start: 2024-07-15

## 2024-07-15 RX ORDER — PROPYLENE GLYCOL 0.06 MG/ML
1 SOLUTION/ DROPS OPHTHALMIC
COMMUNITY

## 2024-07-15 RX ORDER — HYDROCORTISONE 25 MG/G
OINTMENT TOPICAL
Qty: 28 G | Refills: 0 | Status: SHIPPED | OUTPATIENT
Start: 2024-07-15

## 2024-07-15 RX ORDER — CLINDAMYCIN AND BENZOYL PEROXIDE 10; 50 MG/G; MG/G
GEL TOPICAL
Qty: 50 G | Refills: 2 | Status: SHIPPED | OUTPATIENT
Start: 2024-07-15

## 2024-07-15 NOTE — PROGRESS NOTES
Subjective     Romaine Crews is a 49 y.o. male who presents for the following: Suspicious Skin Lesion (Right medial thigh- comes and goes x 2 months- currently flat and purple- Chaperone offered and declined.  ) and Metastatic Crohns disease (Anus- pt requesting refills on elidel, hydrocortisone cream and ointment and clobetasol cream. ).     Review of Systems:  No other skin or systemic complaints other than what is documented elsewhere in the note.    The following portions of the chart were reviewed this encounter and updated as appropriate:   Tobacco  Allergies  Meds  Problems  Med Hx  Surg Hx  Fam Hx         Skin Cancer History  No skin cancer on file.      Specialty Problems          Dermatology Problems    Contusion of left thumb without damage to nail    Follicular disorder, unspecified    Hemangioma of skin and subcutaneous tissue    Melanocytic nevi of left lower limb, including hip    Melanocytic nevi of left upper limb, including shoulder    Melanocytic nevi of other parts of face    Melanocytic nevi of right lower limb, including hip    Melanocytic nevi of right upper limb, including shoulder    Melanocytic nevi of trunk    Neoplasm of uncertain behavior of skin    Other melanin hyperpigmentation    Other seborrheic keratosis    Other viral warts    Psoriasis vulgaris        Objective   Well appearing patient in no apparent distress; mood and affect are within normal limits.    A focused skin examination was performed. All findings within normal limits unless otherwise noted below.    Assessment/Plan   1. Metastatic Crohn's disease of skin (Multi)  Anus    Patient with cutaneous inflammation of the perianal skin  -Continue HC ointment 2.5% BID for 2 weeks as needed for flares, then discontinue and resume pimecrolimus BID for maintenance  -Medications refilled today    Related Medications  hydrocortisone 2.5 % ointment  Apply to affected areas twice daily when active as needed. Use less than  14 days per month.    pimecrolimus (Elidel) 1 % cream  Apply to affected areas twice daily as needed    2. Inflamed epidermoid cyst of skin  Right Medial Thigh  Subcutaneous nodule with overlying PIH    -Discussed nature of diagnosis  -Patient desires excision  -Referral to Mohs surgery placed today    Related Procedures  Referral to Dermatology - Mohs Surgery    3. Folliculitis  Right Thigh - Anterior  Follicular based papules/pustules    -Discussed nature of condition  -Discussed treatment options  -This condition is often worsened by heat exposure, sweat exposure, friction/pressure on the skin. Off-load pressure as possible and wear loose, breathable clothing.  -Recommend to start clinda BP gel to affected area once daily    Related Medications  clindamycin-benzoyl peroxide (Benzaclin) gel  Apply to affected areas on the thigh once daily. May bleach fabrics.    4. Psoriasis  Left Elbow - Posterior, Left Knee - Anterior, Right Elbow - Posterior, Right Knee - Anterior  Erythematous papule(s)/plaque(s) with micaceous scale     -Discussed nature of the condition  -Potential for psoriatic arthritis reviewed  -Skin findings occurred after discontinuing stelara and starting entyvio for Crohn's  -Start clobetasol to knees and elbow plaques twice daily when active    -Discussed with/information given to the patient on the risks, benefits and alternatives of the usage of topical corticosteroids, including but not limited to: atrophy (thinning of the skin), striae (stretch marks), telangiectasia (blood vessel growth), and dyspigmentation (discoloration of the skin).  -Recommend to limit long-term use of topical corticosteroids to less than 14 days per month to reduce risk of side effects.      Related Medications  clobetasol (Temovate) 0.05 % cream  Apply to affected area twice daily. Use for 2 weeks, then take 2 weeks off. May restart cycle as needed.    5. Lichen simplex chronicus        Follow up in October as scheduled  for continued management of these complex conditions.    Discussed if there are any changes or development of concerning symptoms (lesion/skin condition is changing, bleeding, enlarging, or worsening) the patient is to contact my office. The patient verbalizes understanding.    Le Lambert MD  7/15/2024

## 2024-10-07 ASSESSMENT — PATIENT GLOBAL ASSESSMENT (PGA): WHAT IS THE PGA: PATIENT GLOBAL ASSESSMENT:  2 - MILD

## 2024-10-07 ASSESSMENT — DERMATOLOGY QUALITY OF LIFE (QOL) ASSESSMENT
RATE HOW BOTHERED YOU ARE BY SYMPTOMS OF YOUR SKIN PROBLEM (EG, ITCHING, STINGING BURNING, HURTING OR SKIN IRRITATION): 6 - ALWAYS BOTHERED
RATE HOW BOTHERED YOU ARE BY SYMPTOMS OF YOUR SKIN PROBLEM (EG, ITCHING, STINGING BURNING, HURTING OR SKIN IRRITATION): 6 - ALWAYS BOTHERED
WHAT SINGLE SKIN CONDITION LISTED BELOW IS THE PATIENT ANSWERING THE QUALITY-OF-LIFE ASSESSMENT QUESTIONS ABOUT: DERMATITIS
WHAT SINGLE SKIN CONDITION LISTED BELOW IS THE PATIENT ANSWERING THE QUALITY-OF-LIFE ASSESSMENT QUESTIONS ABOUT: DERMATITIS
RATE HOW BOTHERED YOU ARE BY EFFECTS OF YOUR SKIN PROBLEMS ON YOUR ACTIVITIES (EG, GOING OUT, ACCOMPLISHING WHAT YOU WANT, WORK ACTIVITIES OR YOUR RELATIONSHIPS WITH OTHERS): 6 - ALWAYS BOTHERED
RATE HOW EMOTIONALLY BOTHERED YOU ARE BY YOUR SKIN PROBLEM (FOR EXAMPLE, WORRY, EMBARRASSMENT, FRUSTRATION): 6 - ALWAYS BOTHERED
RATE HOW BOTHERED YOU ARE BY EFFECTS OF YOUR SKIN PROBLEMS ON YOUR ACTIVITIES (EG, GOING OUT, ACCOMPLISHING WHAT YOU WANT, WORK ACTIVITIES OR YOUR RELATIONSHIPS WITH OTHERS): 6 - ALWAYS BOTHERED
RATE HOW EMOTIONALLY BOTHERED YOU ARE BY YOUR SKIN PROBLEM (FOR EXAMPLE, WORRY, EMBARRASSMENT, FRUSTRATION): 6 - ALWAYS BOTHERED
DATE THE QUALITY-OF-LIFE ASSESSMENT WAS COMPLETED: 67120

## 2024-10-09 ENCOUNTER — APPOINTMENT (OUTPATIENT)
Dept: DERMATOLOGY | Facility: CLINIC | Age: 49
End: 2024-10-09
Payer: COMMERCIAL

## 2024-10-09 DIAGNOSIS — R23.8 INFLAMMATORY PAPULE: ICD-10-CM

## 2024-10-09 DIAGNOSIS — K50.918: Primary | ICD-10-CM

## 2024-10-09 DIAGNOSIS — L40.9 PSORIASIS: ICD-10-CM

## 2024-10-09 DIAGNOSIS — L73.9 FOLLICULITIS: ICD-10-CM

## 2024-10-09 DIAGNOSIS — L92.8: Primary | ICD-10-CM

## 2024-10-09 PROCEDURE — 99213 OFFICE O/P EST LOW 20 MIN: CPT | Performed by: DERMATOLOGY

## 2024-10-09 PROCEDURE — 1036F TOBACCO NON-USER: CPT | Performed by: DERMATOLOGY

## 2024-10-09 RX ORDER — POLYETHYLENE GLYCOL-3350 AND ELECTROLYTES 236; 6.74; 5.86; 2.97; 22.74 G/274.31G; G/274.31G; G/274.31G; G/274.31G; G/274.31G
POWDER, FOR SOLUTION ORAL
COMMUNITY
Start: 2024-07-31

## 2024-10-09 RX ORDER — FLUOXETINE 10 MG/1
10 CAPSULE ORAL
COMMUNITY
Start: 2024-10-07 | End: 2025-01-05

## 2024-10-09 NOTE — PROGRESS NOTES
Subjective     Romaine Crews is a 49 y.o. male who presents for the following: Metastatic Crohns  (Anus- pt states uses HC ointment and elidel with good response. ), Folliculitis  (Right thigh - clinda bp gel - pt states no change. ), Psoriasis (Elbows/knees- pt currently using clobetasol cream with good response. ), and Suspicious Skin Lesion (Redness to nose. ).     Review of Systems:  No other skin or systemic complaints other than what is documented elsewhere in the note.    The following portions of the chart were reviewed this encounter and updated as appropriate:   Tobacco  Allergies  Meds  Problems  Med Hx  Surg Hx  Fam Hx             Specialty Problems          Dermatology Problems    Contusion of left thumb without damage to nail    Follicular disorder, unspecified    Hemangioma of skin and subcutaneous tissue    Melanocytic nevi of left lower limb, including hip    Melanocytic nevi of left upper limb, including shoulder    Melanocytic nevi of other parts of face    Melanocytic nevi of right lower limb, including hip    Melanocytic nevi of right upper limb, including shoulder    Melanocytic nevi of trunk    Neoplasm of uncertain behavior of skin    Other melanin hyperpigmentation    Other seborrheic keratosis    Other viral warts    Psoriasis vulgaris        Objective   Well appearing patient in no apparent distress; mood and affect are within normal limits.    A focused skin examination was performed. All findings within normal limits unless otherwise noted below.    Assessment/Plan   1. Metastatic Crohn's disease of skin (Multi)  Anus  Patient declines exam    Patient with cutaneous inflammation of the perianal skin  -Continue HC ointment 2.5% BID for 2 weeks as needed for flares, then discontinue and resume pimecrolimus BID for maintenance    Related Medications  hydrocortisone 2.5 % ointment  Apply to affected areas twice daily when active as needed. Use less than 14 days per  month.    pimecrolimus (Elidel) 1 % cream  Apply to affected areas twice daily as needed    2. Folliculitis  Right Thigh - Anterior  Clear today    Continue clinda BP gel to affected area once daily    Related Medications  clindamycin-benzoyl peroxide (Benzaclin) gel  Apply to affected areas on the thigh once daily. May bleach fabrics.    3. Psoriasis  Left Elbow - Posterior, Left Knee - Anterior, Right Elbow - Posterior, Right Knee - Anterior  Erythematous papule(s)/plaque(s) with micaceous scale     -Discussed nature of the condition  -Potential for psoriatic arthritis reviewed  -Skin findings occurred after discontinuing stelara and starting entyvio for Crohn's  -Start clobetasol to knees and elbow plaques twice daily when active    -Discussed with/information given to the patient on the risks, benefits and alternatives of the usage of topical corticosteroids, including but not limited to: atrophy (thinning of the skin), striae (stretch marks), telangiectasia (blood vessel growth), and dyspigmentation (discoloration of the skin).  -Recommend to limit long-term use of topical corticosteroids to less than 14 days per month to reduce risk of side effects.      Related Medications  clobetasol (Temovate) 0.05 % cream  Apply to affected area twice daily. Use for 2 weeks, then take 2 weeks off. May restart cycle as needed.    4. Inflammatory papule  Mid Tip of Nose  Erythematous papule    -Discussed nature of diagnosis  -Duration 1 week  -Recommend to apply clindamycin BP gel to the area twice daily (patient already has rx)        Follow up in December as scheduled for FSE  Discussed if there are any changes or development of concerning symptoms (lesion/skin condition is changing, bleeding, enlarging, or worsening) the patient is to contact my office. The patient verbalizes understanding.    Le Lambert MD  10/9/2024

## 2024-11-15 ENCOUNTER — APPOINTMENT (OUTPATIENT)
Dept: DERMATOLOGY | Facility: CLINIC | Age: 49
End: 2024-11-15
Payer: COMMERCIAL

## 2024-11-15 DIAGNOSIS — D48.5 NEOPLASM OF UNCERTAIN BEHAVIOR OF SKIN: Primary | ICD-10-CM

## 2024-11-15 NOTE — PROGRESS NOTES
Excision Operative Note    Date of Surgery:  11/15/2024  Surgeon:  Lorne Rajput MD  Office Location: [address] [address]  Dept: [phone]  Dept Fax: [phone]  Referring Provider: Le Lambert MD  [address] [place], [place] [id number]    Subjective   Romaine Crews is a 49 y.o. male who presents for the following: Excision for neoplasm of uncertain behavior of skin on the right medial thigh.     According to the patient, the lesion has been present for approximately 6 months at the time of diagnosis.  The lesion was painful. However, the lesion has since appeared to resolve spontaneously.     The patient does not have a pacemaker / defibrillator.  The patient does not have a heart valve / joint replacement.    The patient is not on blood thinners.   The patient does not have a history of hepatitis B or C.  The patient does not have a history of HIV.  The patient does have a history of immunosuppression (e.g. organ transplantation, malignancy, medications)    Is it okay to leave a phone message with results? yes  Who else may we leave results with: (name, relationship) n/a    The following portions of the chart were reviewed this encounter and updated as appropriate:         Objective:   Right medial thigh - no palpable nodule or lesion on exam     Assessment/Plan     #Neoplasm of uncertain behavior of skin  Right Medial Thigh  -The possible diagnoses including cyst or furuncle were discussed. However, since the lesion was not palpable on exam today, surgery was not conducted. The patient was advised he can reschedule surgery if the lesion regrows.     The patient will follow up with Lorne Rajput MD as needed, and will follow up with their primary dermatologist as scheduled.

## 2024-11-19 ENCOUNTER — APPOINTMENT (OUTPATIENT)
Dept: DERMATOLOGY | Facility: CLINIC | Age: 49
End: 2024-11-19
Payer: COMMERCIAL

## 2024-12-27 ASSESSMENT — DERMATOLOGY QUALITY OF LIFE (QOL) ASSESSMENT
RATE HOW BOTHERED YOU ARE BY SYMPTOMS OF YOUR SKIN PROBLEM (EG, ITCHING, STINGING BURNING, HURTING OR SKIN IRRITATION): 6 - ALWAYS BOTHERED
RATE HOW BOTHERED YOU ARE BY SYMPTOMS OF YOUR SKIN PROBLEM (EG, ITCHING, STINGING BURNING, HURTING OR SKIN IRRITATION): 6 - ALWAYS BOTHERED
WHAT SINGLE SKIN CONDITION LISTED BELOW IS THE PATIENT ANSWERING THE QUALITY-OF-LIFE ASSESSMENT QUESTIONS ABOUT: DERMATITIS
RATE HOW BOTHERED YOU ARE BY EFFECTS OF YOUR SKIN PROBLEMS ON YOUR ACTIVITIES (EG, GOING OUT, ACCOMPLISHING WHAT YOU WANT, WORK ACTIVITIES OR YOUR RELATIONSHIPS WITH OTHERS): 6 - ALWAYS BOTHERED
RATE HOW EMOTIONALLY BOTHERED YOU ARE BY YOUR SKIN PROBLEM (FOR EXAMPLE, WORRY, EMBARRASSMENT, FRUSTRATION): 6 - ALWAYS BOTHERED
RATE HOW BOTHERED YOU ARE BY EFFECTS OF YOUR SKIN PROBLEMS ON YOUR ACTIVITIES (EG, GOING OUT, ACCOMPLISHING WHAT YOU WANT, WORK ACTIVITIES OR YOUR RELATIONSHIPS WITH OTHERS): 6 - ALWAYS BOTHERED
RATE HOW EMOTIONALLY BOTHERED YOU ARE BY YOUR SKIN PROBLEM (FOR EXAMPLE, WORRY, EMBARRASSMENT, FRUSTRATION): 6 - ALWAYS BOTHERED
WHAT SINGLE SKIN CONDITION LISTED BELOW IS THE PATIENT ANSWERING THE QUALITY-OF-LIFE ASSESSMENT QUESTIONS ABOUT: DERMATITIS

## 2024-12-30 ENCOUNTER — APPOINTMENT (OUTPATIENT)
Dept: DERMATOLOGY | Facility: CLINIC | Age: 49
End: 2024-12-30
Payer: COMMERCIAL

## 2024-12-30 DIAGNOSIS — K50.918: Primary | ICD-10-CM

## 2024-12-30 DIAGNOSIS — L40.9 PSORIASIS: ICD-10-CM

## 2024-12-30 DIAGNOSIS — L81.4 LENTIGO: ICD-10-CM

## 2024-12-30 DIAGNOSIS — L92.8: Primary | ICD-10-CM

## 2024-12-30 DIAGNOSIS — L73.9 FOLLICULITIS: ICD-10-CM

## 2024-12-30 DIAGNOSIS — D18.01 HEMANGIOMA OF SKIN: ICD-10-CM

## 2024-12-30 DIAGNOSIS — Z12.83 SCREENING EXAM FOR SKIN CANCER: ICD-10-CM

## 2024-12-30 DIAGNOSIS — D22.9 MULTIPLE BENIGN NEVI: ICD-10-CM

## 2024-12-30 DIAGNOSIS — L82.1 SEBORRHEIC KERATOSIS: ICD-10-CM

## 2024-12-30 PROCEDURE — 99214 OFFICE O/P EST MOD 30 MIN: CPT | Performed by: DERMATOLOGY

## 2024-12-30 PROCEDURE — 1036F TOBACCO NON-USER: CPT | Performed by: DERMATOLOGY

## 2024-12-30 RX ORDER — CLOBETASOL PROPIONATE 0.5 MG/G
CREAM TOPICAL
Qty: 60 G | Refills: 3 | Status: SHIPPED | OUTPATIENT
Start: 2024-12-30

## 2024-12-30 RX ORDER — ERYTHROMYCIN AND BENZOYL PEROXIDE 30; 50 MG/G; MG/G
GEL TOPICAL
Qty: 46.6 G | Refills: 3 | Status: SHIPPED | OUTPATIENT
Start: 2024-12-30

## 2024-12-30 RX ORDER — HYDROCORTISONE 25 MG/G
OINTMENT TOPICAL
Qty: 28 G | Refills: 3 | Status: SHIPPED | OUTPATIENT
Start: 2024-12-30

## 2024-12-30 RX ORDER — PIMECROLIMUS 10 MG/G
CREAM TOPICAL
Qty: 60 G | Refills: 3 | Status: SHIPPED | OUTPATIENT
Start: 2024-12-30

## 2024-12-30 NOTE — PROGRESS NOTES
Subjective     Romaine Crews is a 49 y.o. male who presents for the following: Skin Check (Pt denies personal history of skin cancer. Chaperone offered and declined.  ).     Review of Systems:  No other skin or systemic complaints other than what is documented elsewhere in the note.    The following portions of the chart were reviewed this encounter and updated as appropriate:  Tobacco  Allergies  Meds  Problems  Med Hx  Surg Hx  Fam Hx                Objective     Well appearing patient in no apparent distress; mood and affect are within normal limits.    A full examination was performed including scalp, face, neck, chest, abdomen, back, bilateral upper extremities, bilateral lower extremities, genitalia.    All findings within normal limits unless otherwise noted below.    Assessment/Plan   1. Metastatic Crohn's disease of skin (Multi)  Anus  Clear today    -Continue HC ointment 2.5% BID for 2 weeks as needed for flares, then discontinue and resume pimecrolimus BID for maintenance    Related Medications  hydrocortisone 2.5 % ointment  Apply to affected areas twice daily when active as needed. Use less than 14 days per month.    pimecrolimus (Elidel) 1 % cream  Apply to affected areas twice daily as needed    2. Folliculitis  Right Thigh - Anterior  Clear today    Patient notes topical clindamycin can flare colitis. Will change to medication without clindamycin.  -    Related Medications  erythromycin-benzoyl peroxide (Benzamycin) gel  Apply to affected areas of folliculitis once daily for prevention and twice daily for active spots    3. Psoriasis  Left Elbow - Posterior, Left Knee - Anterior, Right Elbow - Posterior, Right Knee - Anterior  Erythematous papule(s)/plaque(s) with micaceous scale     -Discussed nature of the condition  -Potential for psoriatic arthritis reviewed  -Skin findings occurred after discontinuing stelara and starting entyvio for Crohn's  -Continue clobetasol to knees and elbow  plaques twice daily when active    -Discussed with/information given to the patient on the risks, benefits and alternatives of the usage of topical corticosteroids, including but not limited to: atrophy (thinning of the skin), striae (stretch marks), telangiectasia (blood vessel growth), and dyspigmentation (discoloration of the skin).  -Recommend to limit long-term use of topical corticosteroids to less than 14 days per month to reduce risk of side effects.      Related Medications  clobetasol (Temovate) 0.05 % cream  Apply to affected area twice daily. Use for 2 weeks, then take 2 weeks off. May restart cycle as needed.    4. Multiple benign nevi  Brown and tan macules and papules with reassuring findings on dermoscopy    -These lesions have benign, reassuring patterns on dermoscopy  -Recommend continued self observation, and to contact the office if any changes in nevi are noticed    5. Lentigo  Tan macules    -Benign appearing on exam  -Reassurance, recommend observation    6. Seborrheic keratosis  Stuck on, waxy macule(s)/papule(s)/plaque(s) with comedo-like openings and milia like cysts    -Discussed the nature of the diagnosis  -Reassurance, recommend continued observation    7. Hemangioma of skin  Cherry red papules    -Discussed the nature of the diagnosis  -Reassurance, recommend continued observation    8. Screening exam for skin cancer    Related Procedures  Follow Up In Dermatology - Established Patient        Discussed/information given on safe sun practices and use of sunscreen, sun protective clothing or sun avoidance. Recommend to use over the counter medication of sunscreen with a SPF 30 or higher on a daily basis prior to sun exposure to reduce the risk of skin cancer.    Follow up in 1 year for FSE  Discussed if there are any changes or development of concerning symptoms (lesion/skin condition is changing, bleeding, enlarging, or worsening) the patient is to contact my office. The patient  verbalizes understanding.     Le Lambert MD  12/30/2024

## 2025-11-19 ENCOUNTER — APPOINTMENT (OUTPATIENT)
Dept: DERMATOLOGY | Facility: CLINIC | Age: 50
End: 2025-11-19
Payer: COMMERCIAL

## 2026-01-05 ENCOUNTER — APPOINTMENT (OUTPATIENT)
Dept: DERMATOLOGY | Facility: CLINIC | Age: 51
End: 2026-01-05
Payer: COMMERCIAL